# Patient Record
Sex: MALE | Race: WHITE
[De-identification: names, ages, dates, MRNs, and addresses within clinical notes are randomized per-mention and may not be internally consistent; named-entity substitution may affect disease eponyms.]

---

## 2019-05-14 ENCOUNTER — HOSPITAL ENCOUNTER (OUTPATIENT)
Dept: HOSPITAL 63 - SURG | Age: 49
Discharge: HOME | End: 2019-05-14
Attending: INTERNAL MEDICINE
Payer: COMMERCIAL

## 2019-05-14 VITALS — SYSTOLIC BLOOD PRESSURE: 11 MMHG | DIASTOLIC BLOOD PRESSURE: 61 MMHG

## 2019-05-14 DIAGNOSIS — K08.409: ICD-10-CM

## 2019-05-14 DIAGNOSIS — Z12.11: Primary | ICD-10-CM

## 2019-05-14 DIAGNOSIS — K21.9: ICD-10-CM

## 2019-05-14 DIAGNOSIS — F41.9: ICD-10-CM

## 2019-05-14 DIAGNOSIS — Z80.42: ICD-10-CM

## 2019-05-14 DIAGNOSIS — Z87.440: ICD-10-CM

## 2019-05-14 DIAGNOSIS — Z79.899: ICD-10-CM

## 2019-05-14 DIAGNOSIS — F32.9: ICD-10-CM

## 2019-05-14 DIAGNOSIS — Z72.89: ICD-10-CM

## 2019-05-14 DIAGNOSIS — Z88.8: ICD-10-CM

## 2019-05-14 PROCEDURE — 45378 DIAGNOSTIC COLONOSCOPY: CPT

## 2019-09-11 ENCOUNTER — HOSPITAL ENCOUNTER (OUTPATIENT)
Dept: HOSPITAL 61 - KCIC MRI | Age: 49
Discharge: HOME | End: 2019-09-11
Payer: COMMERCIAL

## 2019-09-11 DIAGNOSIS — M48.02: ICD-10-CM

## 2019-09-11 DIAGNOSIS — M47.812: Primary | ICD-10-CM

## 2019-09-11 DIAGNOSIS — Z90.89: ICD-10-CM

## 2019-09-11 DIAGNOSIS — M25.78: ICD-10-CM

## 2019-09-11 DIAGNOSIS — M50.321: ICD-10-CM

## 2019-09-11 PROCEDURE — 72141 MRI NECK SPINE W/O DYE: CPT

## 2019-09-11 NOTE — [facility]
MRI Cervical Spine Without Contrast

 

History: Cervicalgia, progressive neck pain

 

Technique: Multiplanar, multi sequential noncontrast MR imaging was 

performed of the cervical spine.

 

Comparison: None

 

Findings: Cervical cord caliber is within normal limits. There is mild 

nonspecific prominence of the central canal of the cord about 0.1 cm in 

axial dimension throughout cervical cord. There is straightening of the 

cervical spine, very mild reversal of the mid curvature near C5. There is 

moderate to severe degenerative disc disease at C6-7, mild degenerative 

disc disease C5-6, and minimally at C4-5. There is trace C6-7 endplate 

edema likely reactive/degenerative in etiology.

 

C2-C3:  Neural foramina and spinal canal are adequate.

 

C3-C4:  Neural foramina and spinal canal are adequate. 

 

C4-C5:  There is minimal disc osteophyte complex and bulge. Central canal 

is borderline about 10 mm. There is left uncovertebral degenerative 

change. Right neural foramen is adequate, moderate narrowing of the left 

neural foramen.

 

C5-C6:  There is minimal disc osteophyte complex and bulge. Central canal 

is minimally narrowed to 9 to 10 mm. There is uncovertebral degenerative 

change. There is moderate to severe narrowing of the left neural foramen. 

Right neural foramen is minimally narrowed.

 

C6-C7:   There is minimal disc osteophyte complex and bulge. Central canal

is narrowed to about 9 mm, minimal narrowing of the lateral recesses 

greater on the left. There is uncovertebral degenerative change greater on

the left. There is severe narrowing of the left neural foramen, very 

minimal narrowing on the right.

 

C7-T1:  Neural foramina and spinal canal are adequate.

 

Impression: 

1.  There is mild spinal stenosis on the order of 9 mm at C5-6 and C6-7.

2. Uncovertebral degenerative change contributes to neural foramina 

compromise, more significant narrowing on the left at C5-6 and C6-7, to a 

somewhat lesser degree on the left at C4-5.

3. There is moderate to severe degenerative disc disease C6-7, minimally 

at C5-6. There is mild spondylosis.

4. 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

 

Electronically signed by: Last Willis MD (9/11/2019 4:17 PM) 

[id number]